# Patient Record
Sex: FEMALE | Race: WHITE | NOT HISPANIC OR LATINO | URBAN - METROPOLITAN AREA
[De-identification: names, ages, dates, MRNs, and addresses within clinical notes are randomized per-mention and may not be internally consistent; named-entity substitution may affect disease eponyms.]

---

## 2017-06-21 ENCOUNTER — OFFICE (AMBULATORY)
Dept: URBAN - METROPOLITAN AREA CLINIC 75 | Facility: CLINIC | Age: 72
End: 2017-06-21

## 2017-06-21 VITALS
HEART RATE: 88 BPM | SYSTOLIC BLOOD PRESSURE: 188 MMHG | WEIGHT: 211 LBS | HEIGHT: 66 IN | DIASTOLIC BLOOD PRESSURE: 95 MMHG

## 2017-06-21 DIAGNOSIS — R13.10 DYSPHAGIA, UNSPECIFIED: ICD-10-CM

## 2017-06-21 DIAGNOSIS — K59.00 CONSTIPATION, UNSPECIFIED: ICD-10-CM

## 2017-06-21 DIAGNOSIS — R19.4 CHANGE IN BOWEL HABIT: ICD-10-CM

## 2017-06-21 PROCEDURE — 99213 OFFICE O/P EST LOW 20 MIN: CPT | Performed by: INTERNAL MEDICINE

## 2017-08-28 ENCOUNTER — APPOINTMENT (OUTPATIENT)
Dept: WOMENS IMAGING | Facility: HOSPITAL | Age: 72
End: 2017-08-28

## 2017-08-28 PROCEDURE — 77063 BREAST TOMOSYNTHESIS BI: CPT | Performed by: RADIOLOGY

## 2017-08-28 PROCEDURE — MDREVIEWSP: Performed by: RADIOLOGY

## 2017-08-28 PROCEDURE — G0202 SCR MAMMO BI INCL CAD: HCPCS | Performed by: RADIOLOGY

## 2018-05-07 ENCOUNTER — OFFICE (AMBULATORY)
Dept: URBAN - METROPOLITAN AREA CLINIC 75 | Facility: CLINIC | Age: 73
End: 2018-05-07

## 2018-05-07 VITALS
HEIGHT: 66 IN | HEART RATE: 92 BPM | WEIGHT: 211 LBS | SYSTOLIC BLOOD PRESSURE: 172 MMHG | DIASTOLIC BLOOD PRESSURE: 82 MMHG

## 2018-05-07 DIAGNOSIS — K21.9 GASTRO-ESOPHAGEAL REFLUX DISEASE WITHOUT ESOPHAGITIS: ICD-10-CM

## 2018-05-07 DIAGNOSIS — K57.92 DIVERTICULITIS OF INTESTINE, PART UNSPECIFIED, WITHOUT PERFO: ICD-10-CM

## 2018-05-07 PROCEDURE — 99214 OFFICE O/P EST MOD 30 MIN: CPT

## 2018-05-09 ENCOUNTER — HOSPITAL ENCOUNTER (EMERGENCY)
Facility: HOSPITAL | Age: 73
Discharge: HOME | End: 2018-05-09
Attending: EMERGENCY MEDICINE | Admitting: EMERGENCY MEDICINE

## 2018-05-09 VITALS
RESPIRATION RATE: 16 BRPM | BODY MASS INDEX: 31.67 KG/M2 | HEART RATE: 67 BPM | WEIGHT: 209 LBS | SYSTOLIC BLOOD PRESSURE: 157 MMHG | HEIGHT: 68 IN | TEMPERATURE: 98.3 F | DIASTOLIC BLOOD PRESSURE: 96 MMHG | OXYGEN SATURATION: 95 %

## 2018-05-09 DIAGNOSIS — R10.9 RIGHT FLANK PAIN: Primary | ICD-10-CM

## 2018-05-09 LAB
BILIRUB UR QL STRIP: NEGATIVE
CLARITY UR: CLEAR
COLOR UR: YELLOW
GLUCOSE UR STRIP-MCNC: NEGATIVE MG/DL
HGB UR QL STRIP.AUTO: NEGATIVE
KETONES UR QL STRIP: NEGATIVE
LEUKOCYTE ESTERASE UR QL STRIP.AUTO: NEGATIVE
NITRITE UR QL STRIP: NEGATIVE
PH UR STRIP.AUTO: 8 [PH] (ref 5–8)
PROT UR QL STRIP: NEGATIVE
SP GR UR STRIP: 1.01 (ref 1–1.03)
UROBILINOGEN UR QL STRIP: NORMAL

## 2018-05-09 PROCEDURE — 81003 URINALYSIS AUTO W/O SCOPE: CPT | Performed by: EMERGENCY MEDICINE

## 2018-05-09 PROCEDURE — 99283 EMERGENCY DEPT VISIT LOW MDM: CPT

## 2018-05-09 RX ORDER — CYCLOBENZAPRINE HCL 5 MG
5 TABLET ORAL 3 TIMES DAILY PRN
Qty: 20 TABLET | Refills: 0 | Status: SHIPPED | OUTPATIENT
Start: 2018-05-09 | End: 2019-06-13

## 2018-05-09 RX ORDER — TRAZODONE HYDROCHLORIDE 50 MG/1
50 TABLET ORAL NIGHTLY
COMMUNITY

## 2018-05-09 RX ORDER — DEXLANSOPRAZOLE 60 MG/1
60 CAPSULE, DELAYED RELEASE ORAL DAILY
COMMUNITY
End: 2020-10-28

## 2018-05-09 NOTE — DISCHARGE INSTRUCTIONS
Take ibuprofen or Aleve as needed for pain.    If you start to develop rash call PCP for treatment of shingles.

## 2018-05-09 NOTE — ED PROVIDER NOTES
[facility] EMERGENCY DEPARTMENT ENCOUNTER    CHIEF COMPLAINT  Chief Complaint: flank pain  History given by: patient, spouse  History limited by: nothing  [facility] Room Number: 45/45  PMD: Chanel Wu MD  GI- Dr. Smith    HPI:  Pt is a 72 y.o. female who presents complaining of sharp, right flank pain that woke her up from sleep around 0400 this morning. Pt states that her pain improves with standing upright and that her pain has improved to a dull ache at this time. Pt denies recent injury or lifting something heavy recently. Pt denies hematuria, dysuria, abd pain or additional complaint. Pt's spouse states that she has had similar pain in the morning previously but states that her pain was more severe than normal this morning. Pt states that she has used heat and taken Aleve without relief.    Onset: sudden  Duration: 7.5 hours  Severity: moderate to severe  Associated symptoms: none  Previous treatment: Pt states that she has used heat and taken Aleve without relief.    PAST MEDICAL HISTORY  Active Ambulatory Problems     Diagnosis Date Noted   • No Active Ambulatory Problems     Resolved Ambulatory Problems     Diagnosis Date Noted   • No Resolved Ambulatory Problems     Past Medical History:   Diagnosis Date   • Disease of thyroid gland    • Hyperlipidemia    • Hypertension        PAST SURGICAL HISTORY  Past Surgical History:   Procedure Laterality Date   • CATARACT EXTRACTION Bilateral    • REPLACEMENT TOTAL KNEE BILATERAL Bilateral        FAMILY HISTORY  Family History   Problem Relation Age of Onset   • Heart failure Mother    • Heart failure Father    • Heart failure Brother        SOCIAL HISTORY  Social History     Social History   • Marital status: Single     Spouse name: N/A   • Number of children: N/A   • Years of education: N/A     Occupational History   • Not on file.     Social History Main Topics   • Smoking status: Never Smoker   • Smokeless tobacco: Never Used   • Alcohol use No   • Drug use: No    • Sexual activity: Defer     Other Topics Concern   • Not on file     Social History Narrative   • No narrative on file       ALLERGIES  Review of patient's allergies indicates no known allergies.    REVIEW OF SYSTEMS  Review of Systems   Constitutional: Negative for fever.   HENT: Negative for sore throat.    Eyes: Negative.    Respiratory: Negative for cough and shortness of breath.    Cardiovascular: Negative for chest pain.   Gastrointestinal: Negative for abdominal pain, diarrhea and vomiting.   Genitourinary: Positive for flank pain (right). Negative for dysuria.   Musculoskeletal: Negative for neck pain.   Skin: Negative for rash.   Allergic/Immunologic: Negative.    Neurological: Negative for weakness, numbness and headaches.   Hematological: Negative.    Psychiatric/Behavioral: Negative.    All other systems reviewed and are negative.      PHYSICAL EXAM  ED Triage Vitals   Temp Heart Rate Resp BP SpO2   05/09/18 1039 05/09/18 1039 05/09/18 1039 05/09/18 1108 05/09/18 1039   98.3 °F (36.8 °C) 70 14 (!) 162/102 99 %      Temp src Heart Rate Source Patient Position BP Location FiO2 (%)   05/09/18 1039 05/09/18 1039 05/09/18 1108 05/09/18 1108 --   Tympanic Monitor Sitting Right arm        Physical Exam   Constitutional: She is oriented to person, place, and time. No distress.   HENT:   Head: Normocephalic and atraumatic.   Eyes: EOM are normal. Pupils are equal, round, and reactive to light.   Neck: Normal range of motion. Neck supple.   Cardiovascular: Normal rate, regular rhythm and normal heart sounds.    Pulmonary/Chest: Effort normal and breath sounds normal. No respiratory distress.   Abdominal: Soft. Bowel sounds are normal. There is no tenderness. There is no rebound and no guarding.   Musculoskeletal: Normal range of motion. She exhibits no edema.   Neurological: She is alert and oriented to person, place, and time. She has normal sensation and normal strength.   Skin: Skin is warm and dry. No rash  noted.   Psychiatric: Mood and affect normal.   Nursing note and vitals reviewed.      LAB RESULTS  Lab Results (last 24 hours)     Procedure Component Value Units Date/Time    Urinalysis With / Microscopic If Indicated - Urine, Clean Catch [[id number]]  (Normal) Collected:  05/09/18 1129    Specimen:  Urine from Urine, Clean Catch Updated:  05/09/18 1140     Color, UA Yellow     Appearance, UA Clear     pH, UA 8.0     Specific Gravity, UA 1.010     Glucose, UA Negative     Ketones, UA Negative     Bilirubin, UA Negative     Blood, UA Negative     Protein, UA Negative     Leuk Esterase, UA Negative     Nitrite, UA Negative     Urobilinogen, UA 0.2 E.U./dL    Narrative:       Urine microscopic not indicated.          I ordered the above labs and reviewed the results    PROCEDURES  Procedures      PROGRESS AND CONSULTS  ED Course     1125- Ordered UA for further evaluation.     1139- Rechecked pt. Pt is resting comfortably. Notified pt and family that I have reviewed the pt's recent CT Abd/Pelvis and that the pt's diverticulitis should have resolved and that the pt did not have any stones seen on CT. Notified pt and family that I will order an UA for further evaluation but that the pt had a negative UA several days ago. D/w pt and family that there could be several etiologies for her symptoms, including musculoskeletal pain or early shingles. Pt understands and agrees with the plan, all questions answered.    1158- Rechecked pt. Pt is resting comfortably. Notified pt and family of the pt's unremarkable UA results. Discussed the plan to discharge the pt home with a prescription for a muscle relaxant since the pt's symptoms seems to be musculoskeletal in etiology. I instructed the pt to take ibuprofen or Aleve as needed for pain and to call her PCP if she develops a rash c/w shingles. Pt understands and agrees with the plan, all questions answered.    MEDICAL DECISION MAKING  Results were reviewed/discussed with the  patient and they were also made aware of online access. Pt also made aware that some labs, such as cultures, will not be resulted during ER visit and follow up with PMD is necessary.     MDM  Number of Diagnoses or Management Options  Right flank pain:      Amount and/or Complexity of Data Reviewed  Clinical lab tests: ordered and reviewed (UA is unremarkable)  Decide to obtain previous medical records or to obtain history from someone other than the patient: yes  Obtain history from someone other than the patient: yes (spouse)  Review and summarize past medical records: yes (Pt had a CT Renal Stone Protocol on 4/17/18 that showed mild acute diverticulitis involving the proximal sigmoid colon but no ureterolithiasis or nephrolithiasis was noted.)    Patient Progress  Patient progress: stable         DIAGNOSIS  Final diagnoses:   Right flank pain       DISPOSITION  DISCHARGE    Patient discharged in stable condition.    Reviewed implications of results, diagnosis, meds, responsibility to follow up, warning signs and symptoms of possible worsening, potential complications and reasons to return to ER, including fever, worsening pain or any concerns.    Patient/Family voiced understanding of above instructions.    Discussed plan for discharge, as there is no emergent indication for admission. Patient referred to primary care provider for BP management due to today's BP. Pt/family is agreeable and understands need for follow up and repeat testing.  Pt is aware that discharge does not mean that nothing is wrong but it indicates no emergency is present that requires admission and they must continue care with follow-up as given below or physician of their choice.     FOLLOW-UP  Chanel Wu MD  [address]  [address]  [place] [address]  [phone]    Call   As needed, If symptoms worsen or if you develop a rash         Medication List      New Prescriptions    cyclobenzaprine 5 MG tablet  Commonly  known as:  FLEXERIL  Take 1 tablet by mouth 3 (Three) Times a Day As Needed for Muscle Spasms.        Stop    levothyroxine sodium 100 MCG capsule  Commonly known as:  TIROSINT              Latest Documented Vital Signs:  As of 12:03 PM  BP- 161/83 HR- 75 Temp- 98.3 °F (36.8 °C) (Tympanic) O2 sat- 93%    --  Documentation assistance provided by carlos Louis for Dr. Rausch.  Information recorded by the scribe was done at my direction and has been verified and validated by me.     Mally Louis  05/09/18 2979       Chris Rausch MD  05/09/18 8456

## 2018-05-09 NOTE — ED TRIAGE NOTES
"Patient reports a \"sharp, constant pain along the right side of my waistline\" that began last pm. She has a hx of diverticulitis and had a flare up last month. She had a CT scan done at [facility]. She reports the pain \"doesn't feel like diverticulitis pain\".   "

## 2018-06-18 VITALS
OXYGEN SATURATION: 92 % | RESPIRATION RATE: 15 BRPM | HEIGHT: 66 IN | OXYGEN SATURATION: 91 % | HEART RATE: 95 BPM | TEMPERATURE: 98.8 F | DIASTOLIC BLOOD PRESSURE: 87 MMHG | SYSTOLIC BLOOD PRESSURE: 157 MMHG | DIASTOLIC BLOOD PRESSURE: 91 MMHG | OXYGEN SATURATION: 93 % | RESPIRATION RATE: 39 BRPM | HEART RATE: 87 BPM | DIASTOLIC BLOOD PRESSURE: 67 MMHG | SYSTOLIC BLOOD PRESSURE: 132 MMHG | RESPIRATION RATE: 24 BRPM | RESPIRATION RATE: 16 BRPM | RESPIRATION RATE: 14 BRPM | OXYGEN SATURATION: 95 % | DIASTOLIC BLOOD PRESSURE: 85 MMHG | HEART RATE: 78 BPM | WEIGHT: 208 LBS | SYSTOLIC BLOOD PRESSURE: 142 MMHG | HEART RATE: 74 BPM | SYSTOLIC BLOOD PRESSURE: 156 MMHG | DIASTOLIC BLOOD PRESSURE: 70 MMHG | HEART RATE: 90 BPM | SYSTOLIC BLOOD PRESSURE: 153 MMHG | DIASTOLIC BLOOD PRESSURE: 94 MMHG | RESPIRATION RATE: 28 BRPM | SYSTOLIC BLOOD PRESSURE: 137 MMHG | OXYGEN SATURATION: 90 % | OXYGEN SATURATION: 97 % | RESPIRATION RATE: 30 BRPM | HEART RATE: 85 BPM | TEMPERATURE: 98.6 F | RESPIRATION RATE: 27 BRPM

## 2018-06-19 ENCOUNTER — HOSPITAL ENCOUNTER (EMERGENCY)
Facility: HOSPITAL | Age: 73
Discharge: HOME | End: 2018-06-19
Attending: EMERGENCY MEDICINE | Admitting: EMERGENCY MEDICINE

## 2018-06-19 VITALS
OXYGEN SATURATION: 98 % | BODY MASS INDEX: 31.23 KG/M2 | WEIGHT: 199 LBS | SYSTOLIC BLOOD PRESSURE: 159 MMHG | TEMPERATURE: 98.3 F | DIASTOLIC BLOOD PRESSURE: 82 MMHG | RESPIRATION RATE: 16 BRPM | HEART RATE: 95 BPM | HEIGHT: 67 IN

## 2018-06-19 DIAGNOSIS — S09.90XA MINOR HEAD INJURY WITHOUT LOSS OF CONSCIOUSNESS, INITIAL ENCOUNTER: ICD-10-CM
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
## 2018-06-19 DIAGNOSIS — S01.81XA FACIAL LACERATION, INITIAL ENCOUNTER: Primary | ICD-10-CM

## 2018-06-19 PROCEDURE — 25010000002 TDAP 5-2.5-18.5 LF-MCG/0.5 SUSPENSION: Performed by: EMERGENCY MEDICINE

## 2018-06-19 PROCEDURE — 90715 TDAP VACCINE 7 YRS/> IM: CPT | Performed by: EMERGENCY MEDICINE

## 2018-06-19 PROCEDURE — 99283 EMERGENCY DEPT VISIT LOW MDM: CPT

## 2018-06-19 PROCEDURE — 90471 IMMUNIZATION ADMIN: CPT | Performed by: EMERGENCY MEDICINE

## 2018-06-19 PROCEDURE — 94770: CPT

## 2018-06-19 RX ORDER — LIDOCAINE HYDROCHLORIDE AND EPINEPHRINE 10; 10 MG/ML; UG/ML
10 INJECTION, SOLUTION INFILTRATION; PERINEURAL ONCE
Status: COMPLETED | OUTPATIENT
Start: 2018-06-19 | End: 2018-06-19

## 2018-06-19 RX ADMIN — TETANUS TOXOID, REDUCED DIPHTHERIA TOXOID AND ACELLULAR PERTUSSIS VACCINE, ADSORBED 0.5 ML: 5; 2.5; 8; 8; 2.5 SUSPENSION INTRAMUSCULAR at 13:01

## 2018-06-19 RX ADMIN — LIDOCAINE HYDROCHLORIDE AND EPINEPHRINE 10 ML: 10; 10 INJECTION, SOLUTION INFILTRATION; PERINEURAL at 12:21

## 2018-06-19 NOTE — ED PROVIDER NOTES
EMERGENCY DEPARTMENT ENCOUNTER    CHIEF COMPLAINT  Chief Complaint: Fall  History given by: Patient  History limited by:   Room Number: 37/37  PMD: Chanel Wu MD      HPI:  Pt is a 72 y.o. female who presents complaining of fall that occurred at home. Pt reports that she was standing in driveway when she lost balance which caused her to fall and strike her head on sign post. Pt reports that she struck her forehead, but did not have LOC. Pt denies any other injury or pain. She has been able to ambulate since falls and has no other complaints at this time. Pt does not take anticoagulants. Her tetanus status is unknown    Duration: PTA  Onset: sudden  Timing: constant  Location: L forehead  Radiation: none  Quality: laceration  Intensity/Severity: mild  Progression: unchanged  Associated Symptoms: none  Aggravating Factors: none  Alleviating Factors: none  Previous Episodes: none  Treatment before arrival: none    PAST MEDICAL HISTORY  Active Ambulatory Problems     Diagnosis Date Noted   • No Active Ambulatory Problems     Resolved Ambulatory Problems     Diagnosis Date Noted   • No Resolved Ambulatory Problems     Past Medical History:   Diagnosis Date   • Disease of thyroid gland    • Hyperlipidemia    • Hypertension        PAST SURGICAL HISTORY  Past Surgical History:   Procedure Laterality Date   • CATARACT EXTRACTION Bilateral    • REPLACEMENT TOTAL KNEE BILATERAL Bilateral        FAMILY HISTORY  Family History   Problem Relation Age of Onset   • Heart failure Mother    • Heart failure Father    • Heart failure Brother        SOCIAL HISTORY  Social History     Social History   • Marital status:      Spouse name: N/A   • Number of children: N/A   • Years of education: N/A     Occupational History   • Not on file.     Social History Main Topics   • Smoking status: Never Smoker   • Smokeless tobacco: Never Used   • Alcohol use No   • Drug use: No   • Sexual activity: Defer     Other Topics  Concern   • Not on file     Social History Narrative   • No narrative on file       ALLERGIES  Patient has no known allergies.    REVIEW OF SYSTEMS  Review of Systems   HENT: Negative for sore throat.    Gastrointestinal: Negative for nausea and vomiting.   Musculoskeletal: Negative for back pain, myalgias and neck pain.   Skin: Positive for wound (L forehead).   Neurological: Negative for weakness, light-headedness and headaches.   Psychiatric/Behavioral: The patient is not nervous/anxious.    All other systems reviewed and are negative.      PHYSICAL EXAM  ED Triage Vitals   Temp Heart Rate Resp BP SpO2   06/19/18 1047 06/19/18 1047 06/19/18 1047 06/19/18 1059 06/19/18 1047   98.3 °F (36.8 °C) 92 17 164/95 97 %      Temp src Heart Rate Source Patient Position BP Location FiO2 (%)   06/19/18 1047 06/19/18 1127 -- -- --   Tympanic Monitor          Physical Exam   Constitutional: No distress.   HENT:   Head: Normocephalic. Head is with laceration (4cm vertical laceration to the L forehead). Head is without raccoon's eyes and without Perez's sign.   Right Ear: No hemotympanum.   Left Ear: No hemotympanum.   Nose: No nasal septal hematoma.   Eyes: EOM are normal. Pupils are equal, round, and reactive to light.   Neck: Normal range of motion. No spinous process tenderness and no muscular tenderness present.   Cardiovascular: Normal rate and regular rhythm.    Pulmonary/Chest: Effort normal and breath sounds normal. No respiratory distress.   Abdominal: Soft. There is no tenderness.   Musculoskeletal: Normal range of motion. She exhibits no edema.   Neurological: She is alert. She has normal sensation and normal strength. GCS score is 15.   Skin: Skin is warm.   Nursing note and vitals reviewed.    PROCEDURES  Laceration Repair  Date/Time: 6/19/2018 12:12 PM  Performed by: TANISHA ROSARIO  Authorized by: TANISHA ROSARIO     Consent:     Consent obtained:  Verbal    Consent given by:  Patient  Anesthesia (see MAR for  exact dosages):     Anesthesia method:  Local infiltration    Local anesthetic:  Lidocaine 1% WITH epi  Laceration details:     Location:  Face    Face location:  Forehead    Length (cm):  4  Repair type:     Repair type:  Simple  Pre-procedure details:     Preparation:  Patient was prepped and draped in usual sterile fashion  Exploration:     Hemostasis achieved with:  Epinephrine and direct pressure    Contaminated: no    Treatment:     Area cleansed with:  Saline    Amount of cleaning:  Standard    Irrigation solution:  Sterile saline    Irrigation method:  Pressure wash  Skin repair:     Repair method:  Sutures    Suture size:  5-0    Suture material:  Nylon    Suture technique:  Simple interrupted    Number of sutures:  6  Approximation:     Approximation:  Close  Post-procedure details:     Dressing:  Antibiotic ointment    Patient tolerance of procedure:  Tolerated well, no immediate complications            PROGRESS AND CONSULTS  ED Course as of Jun 19 1217   Tue Jun 19, 2018   1114 Mechanical fall, hit head on sign post.  No LOC or blood thinners.    [EE]      ED Course User Index  [EE] SOFIA Myers   12:18 PM  Laceration has been repaired. Pt will be discharged. Head injury instructions given. Instructed to follow-up with PCP in 1 week for suture removal. Pt understands and agrees with plan. All concerns addressed.          MEDICAL DECISION MAKING  Results were reviewed/discussed with the patient and they were also made aware of online access. Pt also made aware that some labs, such as cultures, will not be resulted during ER visit and follow up with PMD is necessary.     MDM       DIAGNOSIS  Final diagnoses:   Facial laceration, initial encounter   Minor head injury without loss of consciousness, initial encounter   Fall, initial encounter       DISPOSITION  DISCHARGE    Patient discharged in stable condition.    Reviewed implications of results, diagnosis, meds, responsibility to follow up, warning  signs and symptoms of possible worsening, potential complications and reasons to return to ER.    Patient/Family voiced understanding of above instructions.    Discussed plan for discharge, as there is no emergent indication for admission. Patient referred to primary care provider for BP management due to today's BP. Pt/family is agreeable and understands need for follow up and repeat testing.  Pt is aware that discharge does not mean that nothing is wrong but it indicates no emergency is present that requires admission and they must continue care with follow-up as given below or physician of their choice.     FOLLOW-UP  Chanel Wu MD  [address]  [address]  [place] [address]  [phone]    Call in 1 week  For suture removal, For wound re-check         Medication List      No changes were made to your prescriptions during this visit.           Latest Documented Vital Signs:  As of 12:17 PM  BP- 171/85 HR- 99 Temp- 98.3 °F (36.8 °C) (Tympanic) O2 sat- 97%    --  Documentation assistance provided by carlos Kovacs for Dr. Varela.  Information recorded by the scribe was done at my direction and has been verified and validated by me.     Simon Kovacs  06/19/18 1223       Zoltan Varela MD  06/19/18 [id number]

## 2018-06-19 NOTE — ED NOTES
Pt reports she slipped off a curb and grabbed a handicap pole to catch herself. When she did, she struck her head ion the pole. Pt has an approx 4 cm vertical laceration to left forehead. Pt denies LOC, dizziness, or nausea. Pt does not know last tetanus date but does relate it has been longer than five years.      Donya Montes RN  06/19/18 9340

## 2018-06-22 ENCOUNTER — OFFICE (AMBULATORY)
Dept: URBAN - METROPOLITAN AREA PATHOLOGY 4 | Facility: PATHOLOGY | Age: 73
End: 2018-06-22
Payer: MEDICARE

## 2018-06-22 ENCOUNTER — AMBULATORY SURGICAL CENTER (AMBULATORY)
Dept: URBAN - METROPOLITAN AREA SURGERY 17 | Facility: SURGERY | Age: 73
End: 2018-06-22
Payer: MEDICARE

## 2018-06-22 DIAGNOSIS — K57.30 DIVERTICULOSIS OF LARGE INTESTINE WITHOUT PERFORATION OR ABS: ICD-10-CM

## 2018-06-22 DIAGNOSIS — K57.92 DIVERTICULITIS OF INTESTINE, PART UNSPECIFIED, WITHOUT PERFO: ICD-10-CM

## 2018-06-22 DIAGNOSIS — D12.5 BENIGN NEOPLASM OF SIGMOID COLON: ICD-10-CM

## 2018-06-22 LAB
GI HISTOLOGY: A. UNSPECIFIED: (no result)
GI HISTOLOGY: PDF REPORT: (no result)

## 2018-06-22 PROCEDURE — 88305 TISSUE EXAM BY PATHOLOGIST: CPT | Performed by: INTERNAL MEDICINE

## 2018-06-22 PROCEDURE — 45385 COLONOSCOPY W/LESION REMOVAL: CPT | Performed by: INTERNAL MEDICINE

## 2018-06-22 RX ADMIN — LIDOCAINE HYDROCHLORIDE 50 MG: 10 INJECTION, SOLUTION EPIDURAL; INFILTRATION; INTRACAUDAL; PERINEURAL at 08:45

## 2018-06-22 RX ADMIN — PROPOFOL 25 MG: 10 INJECTION, EMULSION INTRAVENOUS at 08:55

## 2018-06-22 RX ADMIN — PROPOFOL 50 MG: 10 INJECTION, EMULSION INTRAVENOUS at 09:02

## 2018-06-22 RX ADMIN — PROPOFOL 50 MG: 10 INJECTION, EMULSION INTRAVENOUS at 08:45

## 2018-06-22 RX ADMIN — PROPOFOL 25 MG: 10 INJECTION, EMULSION INTRAVENOUS at 08:51

## 2018-06-22 RX ADMIN — PROPOFOL 50 MG: 10 INJECTION, EMULSION INTRAVENOUS at 08:53

## 2018-09-12 ENCOUNTER — APPOINTMENT (OUTPATIENT)
Dept: WOMENS IMAGING | Facility: HOSPITAL | Age: 73
End: 2018-09-12

## 2018-09-12 PROCEDURE — 77063 BREAST TOMOSYNTHESIS BI: CPT | Performed by: RADIOLOGY

## 2018-09-12 PROCEDURE — 77067 SCR MAMMO BI INCL CAD: CPT | Performed by: RADIOLOGY

## 2018-09-12 PROCEDURE — 76641 ULTRASOUND BREAST COMPLETE: CPT | Performed by: RADIOLOGY

## 2018-09-12 PROCEDURE — MDREVIEWSP: Performed by: RADIOLOGY

## 2019-03-12 ENCOUNTER — APPOINTMENT (OUTPATIENT)
Dept: WOMENS IMAGING | Facility: HOSPITAL | Age: 74
End: 2019-03-12

## 2019-03-12 PROCEDURE — MDREVIEWSP: Performed by: RADIOLOGY

## 2019-03-12 PROCEDURE — 76641 ULTRASOUND BREAST COMPLETE: CPT | Performed by: RADIOLOGY

## 2019-03-20 ENCOUNTER — APPOINTMENT (OUTPATIENT)
Dept: WOMENS IMAGING | Facility: HOSPITAL | Age: 74
End: 2019-03-20

## 2019-03-20 PROCEDURE — 76642 ULTRASOUND BREAST LIMITED: CPT | Performed by: RADIOLOGY

## 2019-06-13 ENCOUNTER — APPOINTMENT (OUTPATIENT)
Dept: GENERAL RADIOLOGY | Facility: HOSPITAL | Age: 74
End: 2019-06-13

## 2019-06-13 PROCEDURE — 73610 X-RAY EXAM OF ANKLE: CPT | Performed by: GENERAL PRACTICE

## 2019-06-13 PROCEDURE — 73560 X-RAY EXAM OF KNEE 1 OR 2: CPT | Performed by: GENERAL PRACTICE

## 2019-07-23 ENCOUNTER — OFFICE (AMBULATORY)
Dept: URBAN - METROPOLITAN AREA CLINIC 75 | Facility: CLINIC | Age: 74
End: 2019-07-23

## 2019-07-23 VITALS
HEIGHT: 66 IN | DIASTOLIC BLOOD PRESSURE: 84 MMHG | RESPIRATION RATE: 17 BRPM | SYSTOLIC BLOOD PRESSURE: 130 MMHG | HEART RATE: 86 BPM | WEIGHT: 215 LBS

## 2019-07-23 DIAGNOSIS — K21.9 GASTRO-ESOPHAGEAL REFLUX DISEASE WITHOUT ESOPHAGITIS: ICD-10-CM

## 2019-07-23 DIAGNOSIS — K57.30 DIVERTICULOSIS OF LARGE INTESTINE WITHOUT PERFORATION OR ABS: ICD-10-CM

## 2019-07-23 DIAGNOSIS — K57.92 DIVERTICULITIS OF INTESTINE, PART UNSPECIFIED, WITHOUT PERFO: ICD-10-CM

## 2019-07-23 DIAGNOSIS — D12.5 BENIGN NEOPLASM OF SIGMOID COLON: ICD-10-CM

## 2019-07-23 DIAGNOSIS — K22.2 ESOPHAGEAL OBSTRUCTION: ICD-10-CM

## 2019-07-23 DIAGNOSIS — K59.00 CONSTIPATION, UNSPECIFIED: ICD-10-CM

## 2019-07-23 DIAGNOSIS — K44.9 DIAPHRAGMATIC HERNIA WITHOUT OBSTRUCTION OR GANGRENE: ICD-10-CM

## 2019-07-23 PROCEDURE — 99213 OFFICE O/P EST LOW 20 MIN: CPT | Performed by: NURSE PRACTITIONER

## 2019-09-12 ENCOUNTER — APPOINTMENT (OUTPATIENT)
Dept: WOMENS IMAGING | Facility: HOSPITAL | Age: 74
End: 2019-09-12

## 2019-09-12 PROCEDURE — 76641 ULTRASOUND BREAST COMPLETE: CPT | Performed by: RADIOLOGY

## 2019-09-12 PROCEDURE — 77067 SCR MAMMO BI INCL CAD: CPT | Performed by: RADIOLOGY

## 2019-09-12 PROCEDURE — MDREVIEWSP: Performed by: RADIOLOGY

## 2019-09-12 PROCEDURE — 77063 BREAST TOMOSYNTHESIS BI: CPT | Performed by: RADIOLOGY

## 2020-03-16 ENCOUNTER — APPOINTMENT (OUTPATIENT)
Dept: WOMENS IMAGING | Facility: HOSPITAL | Age: 75
End: 2020-03-16

## 2020-03-16 PROCEDURE — 76641 ULTRASOUND BREAST COMPLETE: CPT | Performed by: RADIOLOGY

## 2020-05-12 VITALS — HEIGHT: 66 IN | WEIGHT: 215 LBS

## 2020-05-14 ENCOUNTER — OFFICE (AMBULATORY)
Dept: URBAN - METROPOLITAN AREA CLINIC 75 | Facility: CLINIC | Age: 75
End: 2020-05-14
Payer: MEDICARE

## 2020-05-14 DIAGNOSIS — R14.0 ABDOMINAL DISTENSION (GASEOUS): ICD-10-CM

## 2020-05-14 DIAGNOSIS — K58.9 IRRITABLE BOWEL SYNDROME WITHOUT DIARRHEA: ICD-10-CM

## 2020-05-14 DIAGNOSIS — K21.9 GASTRO-ESOPHAGEAL REFLUX DISEASE WITHOUT ESOPHAGITIS: ICD-10-CM

## 2020-05-14 DIAGNOSIS — K59.00 CONSTIPATION, UNSPECIFIED: ICD-10-CM

## 2020-05-14 PROCEDURE — 99214 OFFICE O/P EST MOD 30 MIN: CPT | Mod: 95 | Performed by: INTERNAL MEDICINE

## 2020-05-14 RX ORDER — DICYCLOMINE HYDROCHLORIDE 10 MG/1
30 CAPSULE ORAL
Qty: 90 | Refills: 11 | Status: ACTIVE
Start: 2020-05-14

## 2020-09-15 ENCOUNTER — APPOINTMENT (OUTPATIENT)
Dept: WOMENS IMAGING | Facility: HOSPITAL | Age: 75
End: 2020-09-15

## 2020-09-15 PROCEDURE — 76641 ULTRASOUND BREAST COMPLETE: CPT | Performed by: RADIOLOGY

## 2020-09-15 PROCEDURE — G0279 TOMOSYNTHESIS, MAMMO: HCPCS | Performed by: RADIOLOGY

## 2020-09-15 PROCEDURE — 77066 DX MAMMO INCL CAD BI: CPT | Performed by: RADIOLOGY

## 2020-12-04 ENCOUNTER — OFFICE (AMBULATORY)
Dept: URBAN - METROPOLITAN AREA CLINIC 75 | Facility: CLINIC | Age: 75
End: 2020-12-04

## 2020-12-04 VITALS — HEIGHT: 66 IN | WEIGHT: 216 LBS | HEART RATE: 91 BPM | OXYGEN SATURATION: 99 % | TEMPERATURE: 97.4 F

## 2020-12-04 DIAGNOSIS — K44.9 DIAPHRAGMATIC HERNIA WITHOUT OBSTRUCTION OR GANGRENE: ICD-10-CM

## 2020-12-04 DIAGNOSIS — D12.5 BENIGN NEOPLASM OF SIGMOID COLON: ICD-10-CM

## 2020-12-04 DIAGNOSIS — R19.4 CHANGE IN BOWEL HABIT: ICD-10-CM

## 2020-12-04 DIAGNOSIS — K21.9 GASTRO-ESOPHAGEAL REFLUX DISEASE WITHOUT ESOPHAGITIS: ICD-10-CM

## 2020-12-04 DIAGNOSIS — K59.00 CONSTIPATION, UNSPECIFIED: ICD-10-CM

## 2020-12-04 DIAGNOSIS — K57.30 DIVERTICULOSIS OF LARGE INTESTINE WITHOUT PERFORATION OR ABS: ICD-10-CM

## 2020-12-04 DIAGNOSIS — K58.9 IRRITABLE BOWEL SYNDROME WITHOUT DIARRHEA: ICD-10-CM

## 2020-12-04 DIAGNOSIS — R14.0 ABDOMINAL DISTENSION (GASEOUS): ICD-10-CM

## 2020-12-04 PROCEDURE — 99214 OFFICE O/P EST MOD 30 MIN: CPT | Performed by: NURSE PRACTITIONER

## 2020-12-04 RX ORDER — DEXLANSOPRAZOLE 60 MG/1
CAPSULE, DELAYED RELEASE ORAL
Qty: 90 | Refills: 3 | Status: ACTIVE

## 2020-12-04 RX ORDER — DICYCLOMINE HYDROCHLORIDE 10 MG/1
30 CAPSULE ORAL
Qty: 90 | Refills: 11 | Status: ACTIVE
Start: 2020-05-14